# Patient Record
Sex: FEMALE | Race: WHITE | NOT HISPANIC OR LATINO | Employment: FULL TIME | URBAN - METROPOLITAN AREA
[De-identification: names, ages, dates, MRNs, and addresses within clinical notes are randomized per-mention and may not be internally consistent; named-entity substitution may affect disease eponyms.]

---

## 2024-01-17 DIAGNOSIS — E03.9 HYPOTHYROIDISM, UNSPECIFIED TYPE: ICD-10-CM

## 2024-01-17 RX ORDER — LEVOTHYROXINE SODIUM 0.03 MG/1
25 TABLET ORAL
Qty: 30 TABLET | Refills: 1 | Status: CANCELLED | OUTPATIENT
Start: 2024-01-17

## 2024-01-24 ENCOUNTER — LAB (OUTPATIENT)
Dept: FAMILY MEDICINE CLINIC | Facility: CLINIC | Age: 29
End: 2024-01-24
Payer: COMMERCIAL

## 2024-01-25 DIAGNOSIS — E03.9 HYPOTHYROIDISM, UNSPECIFIED TYPE: ICD-10-CM

## 2024-01-25 RX ORDER — LEVOTHYROXINE SODIUM 0.03 MG/1
25 TABLET ORAL
Qty: 90 TABLET | Refills: 1 | Status: SHIPPED | OUTPATIENT
Start: 2024-01-25 | End: 2024-01-29 | Stop reason: SDUPTHER

## 2024-01-29 DIAGNOSIS — E03.9 HYPOTHYROIDISM, UNSPECIFIED TYPE: ICD-10-CM

## 2024-01-29 RX ORDER — LEVOTHYROXINE SODIUM 0.03 MG/1
25 TABLET ORAL
Qty: 90 TABLET | Refills: 1 | Status: SHIPPED | OUTPATIENT
Start: 2024-01-29

## 2025-03-19 ENCOUNTER — OFFICE VISIT (OUTPATIENT)
Dept: FAMILY MEDICINE CLINIC | Facility: CLINIC | Age: 30
End: 2025-03-19
Payer: COMMERCIAL

## 2025-03-19 VITALS
HEART RATE: 80 BPM | SYSTOLIC BLOOD PRESSURE: 112 MMHG | WEIGHT: 196 LBS | HEIGHT: 66 IN | TEMPERATURE: 98.2 F | OXYGEN SATURATION: 98 % | DIASTOLIC BLOOD PRESSURE: 80 MMHG | RESPIRATION RATE: 18 BRPM | BODY MASS INDEX: 31.5 KG/M2

## 2025-03-19 DIAGNOSIS — J20.9 ACUTE BRONCHITIS, UNSPECIFIED ORGANISM: Primary | ICD-10-CM

## 2025-03-19 PROCEDURE — 99213 OFFICE O/P EST LOW 20 MIN: CPT | Performed by: FAMILY MEDICINE

## 2025-03-19 RX ORDER — DEXTROMETHORPHAN HYDROBROMIDE AND PROMETHAZINE HYDROCHLORIDE 15; 6.25 MG/5ML; MG/5ML
5 SYRUP ORAL 4 TIMES DAILY PRN
Qty: 180 ML | Refills: 0 | Status: SHIPPED | OUTPATIENT
Start: 2025-03-19

## 2025-03-19 RX ORDER — AZITHROMYCIN 250 MG/1
TABLET, FILM COATED ORAL
Qty: 6 TABLET | Refills: 0 | Status: SHIPPED | OUTPATIENT
Start: 2025-03-19

## 2025-03-19 NOTE — PROGRESS NOTES
Chief Complaint  Cough (Cough and congestion, started 3 weeks ago. At home covid test was negative. Can't sleep due to cough.)    Subjective          Anabela San presents to [facility]  URI   This is a new problem. The current episode started 1 to 4 weeks ago (3 weeks). The problem has been unchanged. Associated symptoms include congestion, coughing (yellow green sputum), sinus pain and a sore throat (only in AM). Pertinent negatives include no wheezing. She has tried antihistamine (Nyquil) for the symptoms. The treatment provided mild relief.         The following portions of the patient's history were reviewed and updated as appropriate: allergies, current medications, past family history, past medical history, past social history, past surgical history, and problem list.    Objective      Physical Exam  Vitals reviewed.   HENT:      Right Ear: Tympanic membrane, ear canal and external ear normal.      Left Ear: Tympanic membrane, ear canal and external ear normal.      Nose: Congestion present.      Mouth/Throat:      Mouth: Mucous membranes are moist.      Pharynx: No oropharyngeal exudate or posterior oropharyngeal erythema.   Cardiovascular:      Rate and Rhythm: Normal rate and regular rhythm.      Heart sounds: Normal heart sounds.   Pulmonary:      Effort: Pulmonary effort is normal.      Breath sounds: Normal breath sounds. No wheezing or rhonchi.   Neurological:      Mental Status: She is alert.        Result Review :                Assessment and Plan    Diagnoses and all orders for this visit:    1. Acute bronchitis, unspecified organism (Primary)  -     azithromycin (Zithromax Z-Shahram) 250 MG tablet; Take 2 tablets by mouth on day 1, then 1 tablet daily on days 2-5  Dispense: 6 tablet; Refill: 0  -     promethazine-dextromethorphan (PROMETHAZINE-DM) 6.25-15 MG/5ML syrup; Take 5 mL by mouth 4 (Four) Times a Day As Needed for Cough.  Dispense: 180 mL; Refill: 0    Follow  up if no improvement after completing treatment       Follow Up   Return if symptoms worsen or fail to improve.  Patient was given instructions and counseling regarding her condition or for health maintenance advice. Please see specific information pulled into the AVS if appropriate.

## 2025-06-29 ENCOUNTER — TELEMEDICINE (OUTPATIENT)
Dept: FAMILY MEDICINE CLINIC | Facility: TELEHEALTH | Age: 30
End: 2025-06-29
Payer: COMMERCIAL

## 2025-06-29 DIAGNOSIS — L27.0 DRUG RASH: Primary | ICD-10-CM

## 2025-06-29 DIAGNOSIS — H92.09 EARACHE: ICD-10-CM

## 2025-06-29 RX ORDER — PREDNISONE 10 MG/1
TABLET ORAL DAILY
Qty: 21 EACH | Refills: 0 | Status: SHIPPED | OUTPATIENT
Start: 2025-06-29 | End: 2025-07-05

## 2025-06-29 RX ORDER — FLUTICASONE PROPIONATE 50 MCG
2 SPRAY, SUSPENSION (ML) NASAL DAILY
COMMUNITY
Start: 2025-06-21

## 2025-06-29 RX ORDER — AZITHROMYCIN 250 MG/1
TABLET, FILM COATED ORAL
Qty: 6 TABLET | Refills: 0 | Status: SHIPPED | OUTPATIENT
Start: 2025-06-29

## 2025-06-29 NOTE — PATIENT INSTRUCTIONS
Stop Amoxicillin, start azithromycin and prednisone.   Keep follow up with your PCP.   Tylenol for pain and/or fever, stay hydrated and rest.       If symptoms worsen or do not improve follow up with your PCP or visit your nearest Urgent Care Center or ER.

## 2025-06-29 NOTE — PROGRESS NOTES
Subjective   Chief Complaint   Patient presents with    Rash       Anabela San is a 29 y.o. female.     History of Present Illness  Patient has been taking amoxicillin for 8 days for an ear infection.  She woke up this morning with a full body rash, head to toe.  Rash is erythematous and bumpy in some places.  She does have pruritus but this is mild.  She denies hives, rash is not painful.  She states she was told she had a penicillin allergy as a child and this was her first time taking it for 25 years or so.  She reports that she still is experiencing ear fullness and pain.  She has an appointment with her PCP in 4 days to follow-up on the ear complaint.  Rash  Chronicity:  New  Onset:  Today  Progression since onset:  Unchanged  Affected locations:  Diffuse  Characteristics:  Redness and itchiness  Exposed to:  A new medication  Associated symptoms: no anorexia, no congestion, no cough, no diarrhea, no pain, no facial edema, no fatigue, no fever, no joint pain, no nail changes, no rhinorrhea, no shortness of breath, no sore throat and no vomiting         Allergies   Allergen Reactions    Amoxicillin Rash       Past Medical History:   Diagnosis Date    Hypothyroidism 11/16/2022    Latest blood work on 2/20/25 shows nowmal TSH, T3 and T4 levels       Past Surgical History:   Procedure Laterality Date    EAR TUBES  1997       Social History     Socioeconomic History    Marital status: Single   Tobacco Use    Smoking status: Never    Smokeless tobacco: Never   Vaping Use    Vaping status: Never Used   Substance and Sexual Activity    Alcohol use: Yes     Alcohol/week: 1.0 standard drink of alcohol     Types: 1 Cans of beer per week     Comment: Occ    Drug use: Never    Sexual activity: Yes     Partners: Male     Birth control/protection: Vasectomy       Family History   Problem Relation Age of Onset    Thyroid disease Mother     Ovarian cysts Mother     Cancer Maternal Grandmother         lung    Ovarian cysts  Maternal Grandmother     Dementia Paternal Grandfather          Current Outpatient Medications:     fluticasone (FLONASE) 50 MCG/ACT nasal spray, 2 sprays by Each Nare route Daily., Disp: , Rfl:     azithromycin (Zithromax Z-Shahram) 250 MG tablet, Take 2 tablets by mouth on day 1, then 1 tablet daily on days 2-5, Disp: 6 tablet, Rfl: 0    MISC NATURAL PRODUCTS PO, Take  by mouth. Thyroid supplement, Disp: , Rfl:     predniSONE (DELTASONE) 10 MG (21) dose pack, Take  by mouth Daily for 6 days., Disp: 21 each, Rfl: 0      Review of Systems   Constitutional:  Negative for chills, diaphoresis, fatigue and fever.   HENT:  Negative for congestion, facial swelling, rhinorrhea and sore throat.    Eyes:  Negative for pain.   Respiratory:  Negative for cough, shortness of breath and wheezing.    Gastrointestinal:  Negative for anorexia, diarrhea and vomiting.   Musculoskeletal:  Negative for joint pain.   Skin:  Positive for rash. Negative for nail changes.        There were no vitals filed for this visit.    Objective   Physical Exam  Constitutional:       General: She is not in acute distress.     Appearance: Normal appearance. She is not ill-appearing, toxic-appearing or diaphoretic.   HENT:      Head: Normocephalic.      Mouth/Throat:      Lips: Pink.      Mouth: Mucous membranes are moist.   Pulmonary:      Effort: Pulmonary effort is normal.   Skin:     Findings: Erythema and rash present. Rash is macular. Rash is not urticarial.   Neurological:      Mental Status: She is alert and oriented to person, place, and time.          Procedures     Assessment & Plan   Diagnoses and all orders for this visit:    1. Drug rash (Primary)  -     predniSONE (DELTASONE) 10 MG (21) dose pack; Take  by mouth Daily for 6 days.  Dispense: 21 each; Refill: 0    2. Earache  -     azithromycin (Zithromax Z-Shahram) 250 MG tablet; Take 2 tablets by mouth on day 1, then 1 tablet daily on days 2-5  Dispense: 6 tablet; Refill: 0            PLAN:  Discussed dosing, side effects, recommended other symptomatic care.  Patient should follow up with primary care provider, Urgent Care or ER if symptoms worsen, fail to resolve or other symptoms need attention. Patient/family agree to the above.         CORINNE Cordova     Mode of Visit: Video  Location of patient: -HOME-  Location of provider: [facility]  You have chosen to receive care through a telehealth visit.  The patient has signed the video visit consent form.  The visit included audio and video interaction. No technical issues occurred during this visit.    The use of a video visit has been reviewed with the patient and verbal informed consent has been obtained. Myself and Anabela San participated in this visit. The patient is located at [address] [place] [address]. I am located in [place], [place]. Mychart and Zoom were utilized.        This visit was performed via Telehealth.  This patient has been instructed to follow-up with their primary care provider if their symptoms worsen or the treatment provided does not resolve their illness.

## 2025-07-03 ENCOUNTER — OFFICE VISIT (OUTPATIENT)
Dept: FAMILY MEDICINE CLINIC | Facility: CLINIC | Age: 30
End: 2025-07-03
Payer: COMMERCIAL

## 2025-07-03 VITALS
HEIGHT: 66 IN | TEMPERATURE: 98 F | SYSTOLIC BLOOD PRESSURE: 110 MMHG | OXYGEN SATURATION: 98 % | RESPIRATION RATE: 18 BRPM | BODY MASS INDEX: 32.14 KG/M2 | WEIGHT: 200 LBS | DIASTOLIC BLOOD PRESSURE: 76 MMHG | HEART RATE: 70 BPM

## 2025-07-03 DIAGNOSIS — H69.91 ETD (EUSTACHIAN TUBE DYSFUNCTION), RIGHT: Primary | ICD-10-CM

## 2025-07-03 PROCEDURE — 99213 OFFICE O/P EST LOW 20 MIN: CPT | Performed by: FAMILY MEDICINE

## 2025-07-03 NOTE — PROGRESS NOTES
Chief Complaint  Follow-up (Sore throat and ear infection f/u, right side still feeling full.)    Subjective          Anabela San presents to [facility]  Earache  Affected ear:  Right  Chronicity:  Recurrent  Onset:  1 to 4 weeks ago  Progression since onset:  Coming and going  Frequency:  Constantly  Fever:  No fever  Fever duration:  Less than 1 day  Pain - numeric:  3/10  Associated symptoms: dizziness, cough, headaches, hearing loss and congestion    Associated symptoms: no tinnitus, no drainage, no neck pain, no rash, no rhinorrhea, no sore throat, no jaw pain, no hoarse voice and no adenopathy    Treatments tried:  Acetaminophen, NSAIDs, antibiotics, cold packs and heat packs  Improvement on treatment:  Mild  Additional Information:  This is a a follow visit from an ER visit where they diagnosed an ear infection. The 7 pain has stopped, but the ear is still stopped up, mildly painful, and maybe 20% hearing out of it.    Initially treated with amoxicillin, but woke up with rash the next morning.   Changed to steroid pack and Zpak, which has improved sore throat and ear infection.  Still has 1 day of steroid remaining.    The following portions of the patient's history were reviewed and updated as appropriate: allergies, current medications, past family history, past medical history, past social history, past surgical history, and problem list.    Objective      Physical Exam  Vitals and nursing note reviewed.   HENT:      Right Ear: A middle ear effusion is present. Tympanic membrane is retracted.      Left Ear: Tympanic membrane, ear canal and external ear normal.   Pulmonary:      Effort: Pulmonary effort is normal. No respiratory distress.   Neurological:      Mental Status: She is alert.   Psychiatric:         Mood and Affect: Mood normal.        Result Review :                Assessment and Plan    Diagnoses and all orders for this visit:    1. ETD (Eustachian tube  dysfunction), right (Primary)  -     Chlorcyclizine-Pseudoephed 25-60 MG tablet; Take 0.5-1 tablets by mouth 3 (Three) Times a Day As Needed (congestion).  Dispense: 30 tablet; Refill: 0    Complete steroid pack.  Resume treatment with Flonase nasal spray, Stahist added to treatment to relieve symptoms from right ETD.  If symptoms persist, she will contact our office for ENT referral.      Follow Up   Return if symptoms worsen or fail to improve.  Patient was given instructions and counseling regarding her condition or for health maintenance advice. Please see specific information pulled into the AVS if appropriate.